# Patient Record
Sex: FEMALE | Race: ASIAN | URBAN - METROPOLITAN AREA
[De-identification: names, ages, dates, MRNs, and addresses within clinical notes are randomized per-mention and may not be internally consistent; named-entity substitution may affect disease eponyms.]

---

## 2024-05-20 ENCOUNTER — OFFICE VISIT (OUTPATIENT)
Dept: OBGYN CLINIC | Facility: CLINIC | Age: 34
End: 2024-05-20

## 2024-05-20 VITALS
SYSTOLIC BLOOD PRESSURE: 114 MMHG | HEIGHT: 64 IN | WEIGHT: 173 LBS | DIASTOLIC BLOOD PRESSURE: 82 MMHG | BODY MASS INDEX: 29.53 KG/M2 | HEART RATE: 80 BPM

## 2024-05-20 DIAGNOSIS — L73.9 FOLLICULITIS: Primary | ICD-10-CM

## 2024-05-20 NOTE — PROGRESS NOTES
[facility]  Midwifery Focused Gynecology Problem Exam    Arleth Rice is a 33 year old female presenting for Gyn Exam (Patient feels a small bump in vaginal area, thinks its a bartholin cyst, feels alittle better but could not sit a week ago. )  .    HPI:     Chief Complaint   Patient presents with    Gyn Exam     Patient feels a small bump in vaginal area, thinks its a bartholin cyst, feels alittle better but could not sit a week ago.      Arleth presents with concern for possible cyst in vagina. Noticed it first 7-10 days ago. Has improved since she first noticed it. Took epsom salt bath which had helped. Initially very painful. Had slight feverish feeling but this is also normal for her prior to menses.    Not sexually active    Last pap 3 years ago. No history of abnormal paps.    Medications (Active prior to today's visit):  No current outpatient medications on file.     Allergies:  Not on File  HISTORY:   Menstrual History:  OB History    Para Term  AB Living   1 0 0 0 1 0   SAB IAB Ectopic Multiple Live Births   0 1 0 0 0        Patient's last menstrual period was 2024 (exact date).       History reviewed. No pertinent past medical history.    History reviewed. No pertinent surgical history.    History reviewed. No pertinent family history.    Social History     Socioeconomic History    Marital status: Single     Spouse name: Not on file    Number of children: Not on file    Years of education: Not on file    Highest education level: Not on file   Occupational History    Not on file   Tobacco Use    Smoking status: Never    Smokeless tobacco: Never   Substance and Sexual Activity    Alcohol use: Not Currently    Drug use: Never    Sexual activity: Not on file   Other Topics Concern    Not on file   Social History Narrative    Not on file     Social Determinants of Health     Financial Resource Strain: Not on file   Food Insecurity: Unknown (2024)    Received from [email or web address]      Food Insecurities     Worried about running out of food: Not on file     Food Bought: Not on file   Transportation Needs: Unknown (1/21/2024)    [facility]     Transportation     Worried about transportation: Not on file   Physical Activity: Not on file   Stress: Not on file   Social Connections: Not on file   Housing Stability: Unknown (1/21/2024)    Received from [facility]     Housing/Utilities     Worried about losing home: Not on file     Stayed outside house: Not on file     Unable to get utilities: Not on file       ROS:     History obtained from the patient  General ROS: feverish feeling  Genito-Urinary ROS: positive for - vulvar lesion  negative for - change in menstrual cycle, dysuria, genital discharge, irregular/heavy menses, pelvic pain, or urinary frequency/urgency      PHYSICAL EXAM:   /82   Pulse 80   Ht 5' 4\" (1.626 m)   Wt 173 lb (78.5 kg)   LMP 04/25/2024 (Exact Date)   BMI 29.70 kg/m²     GENERAL: well developed, well nourished, in no apparent distress  ABDOMEN: Soft, non distended; non tender, no masses  GYNE/: External Genitalia: Normal appearing, 0.3cm non-tender papule, no surrounding erythema Urethral meatus appear wnl, no abnormal discharge or lesions noted.             ASSESSMENT:        ICD-10-CM    1. Folliculitis  L73.9           PLAN:   Folliculitis seems to be improving. Recommend continuing sitz baths and warm compresses. Call with worsening symptoms.    ORDERS:   No orders of the defined types were placed in this encounter.    PRESCRIPTIONS:     Requested Prescriptions      No prescriptions requested or ordered in this encounter     IMAGING/ REFERRALS:    None     Return to care for annual exam    Shoshana Lopez CNM  5/20/2024  8:50 AM